# Patient Record
Sex: FEMALE | Race: WHITE | NOT HISPANIC OR LATINO
[De-identification: names, ages, dates, MRNs, and addresses within clinical notes are randomized per-mention and may not be internally consistent; named-entity substitution may affect disease eponyms.]

---

## 2021-04-05 ENCOUNTER — TRANSCRIPTION ENCOUNTER (OUTPATIENT)
Age: 51
End: 2021-04-05

## 2021-10-05 ENCOUNTER — TRANSCRIPTION ENCOUNTER (OUTPATIENT)
Age: 51
End: 2021-10-05

## 2021-12-10 ENCOUNTER — TRANSCRIPTION ENCOUNTER (OUTPATIENT)
Age: 51
End: 2021-12-10

## 2024-07-04 ENCOUNTER — NON-APPOINTMENT (OUTPATIENT)
Age: 54
End: 2024-07-04

## 2024-08-12 ENCOUNTER — NON-APPOINTMENT (OUTPATIENT)
Age: 54
End: 2024-08-12

## 2024-08-27 DIAGNOSIS — Z98.84 BARIATRIC SURGERY STATUS: ICD-10-CM

## 2024-09-09 ENCOUNTER — APPOINTMENT (OUTPATIENT)
Dept: BARIATRICS | Facility: CLINIC | Age: 54
End: 2024-09-09

## 2024-09-09 ENCOUNTER — APPOINTMENT (OUTPATIENT)
Dept: BARIATRICS | Facility: CLINIC | Age: 54
End: 2024-09-09
Payer: COMMERCIAL

## 2024-09-09 ENCOUNTER — OUTPATIENT (OUTPATIENT)
Dept: OUTPATIENT SERVICES | Facility: HOSPITAL | Age: 54
LOS: 1 days | End: 2024-09-09
Payer: COMMERCIAL

## 2024-09-09 VITALS
DIASTOLIC BLOOD PRESSURE: 68 MMHG | SYSTOLIC BLOOD PRESSURE: 133 MMHG | BODY MASS INDEX: 52.33 KG/M2 | OXYGEN SATURATION: 95 % | HEART RATE: 82 BPM | HEIGHT: 60.5 IN | TEMPERATURE: 96.7 F | WEIGHT: 273.59 LBS

## 2024-09-09 DIAGNOSIS — E46 UNSPECIFIED PROTEIN-CALORIE MALNUTRITION: ICD-10-CM

## 2024-09-09 DIAGNOSIS — E66.01 MORBID (SEVERE) OBESITY DUE TO EXCESS CALORIES: ICD-10-CM

## 2024-09-09 DIAGNOSIS — Z01.818 ENCOUNTER FOR OTHER PREPROCEDURAL EXAMINATION: ICD-10-CM

## 2024-09-09 DIAGNOSIS — R10.9 UNSPECIFIED ABDOMINAL PAIN: ICD-10-CM

## 2024-09-09 DIAGNOSIS — Z98.84 BARIATRIC SURGERY STATUS: ICD-10-CM

## 2024-09-09 DIAGNOSIS — R63.8 OTHER SYMPTOMS AND SIGNS CONCERNING FOOD AND FLUID INTAKE: ICD-10-CM

## 2024-09-09 DIAGNOSIS — R63.5 ABNORMAL WEIGHT GAIN: ICD-10-CM

## 2024-09-09 PROCEDURE — 74220 X-RAY XM ESOPHAGUS 1CNTRST: CPT | Mod: 26

## 2024-09-09 PROCEDURE — S2083 ADJUSTMENT GASTRIC BAND: CPT

## 2024-09-09 PROCEDURE — 99205 OFFICE O/P NEW HI 60 MIN: CPT

## 2024-09-09 PROCEDURE — 74220 X-RAY XM ESOPHAGUS 1CNTRST: CPT

## 2024-09-10 PROBLEM — R10.9 ABDOMINAL PAIN: Status: ACTIVE | Noted: 2024-09-10

## 2024-09-11 PROBLEM — R63.5 EXCESSIVE WEIGHT GAIN: Status: ACTIVE | Noted: 2024-09-09

## 2024-09-11 PROBLEM — R63.8 DIFFICULTY MAINTAINING WEIGHT: Status: ACTIVE | Noted: 2024-09-09

## 2024-09-11 PROBLEM — E46 SUBOPTIMAL NUTRITION: Status: ACTIVE | Noted: 2024-09-09

## 2024-09-11 NOTE — PROCEDURE
[FreeTextEntry1] : BAND adjustment was performed today. Using standard sterile technique, a 20 - gauge Thayer needle was inserted into the LAP- BAND port.  The patient was seated erect and tolerated water test without any symptoms of reflux, dysphagia or regurgitation.  Under sterile conditions, the port was accessed without complication using manual guidance and non-coring needle. On access, approximately 4.5 cc of fluid was found/measured. Adjustment was performed without complication. Amount of 4.5 cc was removed from Band. Pt tolerated drinking water after procedure without any difficulty.

## 2024-09-11 NOTE — PHYSICAL EXAM
[Obese, well nourished, in no acute distress] : obese, well nourished, in no acute distress [Normal] : affect appropriate [de-identified] : soft, NT, ND, no evidence of hernia or diastasis, incisions well-healed; Lap-Band port site without erythema; some port site tenderness

## 2024-09-11 NOTE — PHYSICAL EXAM
[Obese, well nourished, in no acute distress] : obese, well nourished, in no acute distress [Normal] : affect appropriate [de-identified] : soft, NT, ND, no evidence of hernia or diastasis, incisions well-healed; Lap-Band port site without erythema; some port site tenderness

## 2024-09-11 NOTE — HISTORY OF PRESENT ILLNESS
[Procedure: ___] : Procedure performed: [unfilled]  [Date of Surgery: ___] : Date of Surgery:   [unfilled] [Pre-Op Weight ___] : Pre-op weight was [unfilled] lbs [de-identified] : 53-year-old woman s/p Lap-Band (APS; 2012; pre-op weight 290 lbs; lowest post-op weight 273 lbs) presents to discuss band removal.  Patient reports daily stuck episodes despite having changed her diet to avoid stuck episodes. Also reports port site pain. VE done today shows no prolapse nor band obstruction. Patient is trying to eat 3 protein-rich meals/day, is trying to drink adequate zero-calorie fluids/day, and is walking.  Patient is not sure if she has fluid in Lap Band.

## 2024-09-11 NOTE — REVIEW OF SYSTEMS
[Patient Intake Form Reviewed] : Patient intake form was reviewed [Hernia] : hernia [Negative] : Allergic/Immunologic [Abdominal Pain] : abdominal pain [Vomiting] : no vomiting [Constipation] : no constipation [Diarrhea] : no diarrhea [Reflux/Heartburn] : no reflux/ heartburn [FreeTextEntry7] : see HPI

## 2024-09-11 NOTE — HISTORY OF PRESENT ILLNESS
[Procedure: ___] : Procedure performed: [unfilled]  [Date of Surgery: ___] : Date of Surgery:   [unfilled] [Pre-Op Weight ___] : Pre-op weight was [unfilled] lbs [de-identified] : 53-year-old woman s/p Lap-Band (APS; 2012; pre-op weight 290 lbs; lowest post-op weight 273 lbs) presents to discuss band removal.  Patient reports daily stuck episodes despite having changed her diet to avoid stuck episodes. Also reports port site pain. VE done today shows no prolapse nor band obstruction. Patient is trying to eat 3 protein-rich meals/day, is trying to drink adequate zero-calorie fluids/day, and is walking.  Patient is not sure if she has fluid in Lap Band.

## 2024-09-11 NOTE — ASSESSMENT
[FreeTextEntry1] : 53-year-old woman with a longstanding history of morbid obesity status post lap band 2012 presents today fto discuss Lap Band removal.  Patient reports stuck episodes daily with textured foods. Patient has changed her diet in order to reduce number of stuck episodes. VE done today shows no obstruction nor prolapse. 4.5 cc fluid removed from Band at today's visit. Pt tolerated the procedure well.   Lap Band removal discussed in detail risks, benefits and alternatives, including possible weight gain after removal.   Routine post adjustment nutritional counseling was provided Encouraged better food choices - maintain food log Zero calorie liquid intake 64 oz per day Encouraged to participate in a daily exercise regimen incorporating cardio and strength training Track steps - goal approximately 8-10k steps per day Patient has cardiac workup scheduled with both echo and stress test Follow-up with GI due to port site pain to rule out gastric erosion (9/17/24 appt scheduled) Will schedule Lap Band removal once cardiac workup and EGD completed  All questions answered Call with any questions or concerns Time spent before and after visit reviewing patient's chart

## 2025-04-25 ENCOUNTER — APPOINTMENT (OUTPATIENT)
Dept: ULTRASOUND IMAGING | Facility: CLINIC | Age: 55
End: 2025-04-25
Payer: COMMERCIAL

## 2025-04-25 ENCOUNTER — APPOINTMENT (OUTPATIENT)
Dept: MAMMOGRAPHY | Facility: CLINIC | Age: 55
End: 2025-04-25
Payer: COMMERCIAL

## 2025-04-25 PROCEDURE — 77067 SCR MAMMO BI INCL CAD: CPT

## 2025-04-25 PROCEDURE — 93971 EXTREMITY STUDY: CPT | Mod: RT

## 2025-04-25 PROCEDURE — 77063 BREAST TOMOSYNTHESIS BI: CPT
